# Patient Record
Sex: MALE | Race: WHITE | Employment: OTHER | URBAN - METROPOLITAN AREA
[De-identification: names, ages, dates, MRNs, and addresses within clinical notes are randomized per-mention and may not be internally consistent; named-entity substitution may affect disease eponyms.]

---

## 2018-01-03 ENCOUNTER — ESTABLISHED COMPREHENSIVE EXAM (OUTPATIENT)
Dept: URBAN - METROPOLITAN AREA CLINIC 46 | Facility: CLINIC | Age: 83
End: 2018-01-03

## 2018-01-03 DIAGNOSIS — H35.3130: ICD-10-CM

## 2018-01-03 DIAGNOSIS — H40.013: ICD-10-CM

## 2018-01-03 DIAGNOSIS — E11.9: ICD-10-CM

## 2018-01-03 DIAGNOSIS — Z79.4: ICD-10-CM

## 2018-01-03 PROCEDURE — G8427 DOCREV CUR MEDS BY ELIG CLIN: HCPCS

## 2018-01-03 PROCEDURE — 2022F DILAT RTA XM EVC RTNOPTHY: CPT

## 2018-01-03 PROCEDURE — 92014 COMPRE OPH EXAM EST PT 1/>: CPT

## 2018-01-03 PROCEDURE — 92133 CPTRZD OPH DX IMG PST SGM ON: CPT

## 2018-01-03 PROCEDURE — 92015 DETERMINE REFRACTIVE STATE: CPT

## 2018-01-03 PROCEDURE — 1036F TOBACCO NON-USER: CPT

## 2018-01-03 PROCEDURE — 3072F LOW RISK FOR RETINOPATHY: CPT

## 2018-01-03 ASSESSMENT — TONOMETRY
OD_IOP_MMHG: 13
OS_IOP_MMHG: 13

## 2018-01-03 ASSESSMENT — VISUAL ACUITY
OS_SC: 20/50-2
OS_CC: 20/40-2
OD_SC: CF 4FT
OD_CC: CF 6FT
OS_SC: J6
OS_CC: J2

## 2018-07-09 ENCOUNTER — ESTABLISHED PATIENT (OUTPATIENT)
Dept: URBAN - METROPOLITAN AREA CLINIC 36 | Facility: CLINIC | Age: 83
End: 2018-07-09

## 2018-07-09 DIAGNOSIS — H35.3131: ICD-10-CM

## 2018-07-09 DIAGNOSIS — H26.40: ICD-10-CM

## 2018-07-09 DIAGNOSIS — E11.3292: ICD-10-CM

## 2018-07-09 DIAGNOSIS — H34.8311: ICD-10-CM

## 2018-07-09 DIAGNOSIS — H35.363: ICD-10-CM

## 2018-07-09 DIAGNOSIS — H35.031: ICD-10-CM

## 2018-07-09 PROCEDURE — 2021F DILAT MACULAR EXAM DONE: CPT

## 2018-07-09 PROCEDURE — 2019F DILATED MACUL EXAM DONE: CPT

## 2018-07-09 PROCEDURE — 1036F TOBACCO NON-USER: CPT

## 2018-07-09 PROCEDURE — G8397 DIL MACULA/FUNDUS EXAM/W DOC: HCPCS

## 2018-07-09 PROCEDURE — 92134 CPTRZ OPH DX IMG PST SGM RTA: CPT

## 2018-07-09 PROCEDURE — 9222550 BILAT EXTENDED OPHTHALMOSCOPY, FIRST

## 2018-07-09 PROCEDURE — 92014 COMPRE OPH EXAM EST PT 1/>: CPT

## 2018-07-09 PROCEDURE — 2022F DILAT RTA XM EVC RTNOPTHY: CPT

## 2018-07-09 PROCEDURE — G8427 DOCREV CUR MEDS BY ELIG CLIN: HCPCS

## 2018-07-09 PROCEDURE — G8756 NO BP MEASURE DOC: HCPCS

## 2018-07-09 ASSESSMENT — VISUAL ACUITY
OS_CC: 20/40-1
OD_CC: CF 6FT

## 2018-07-09 ASSESSMENT — TONOMETRY
OD_IOP_MMHG: 13
OS_IOP_MMHG: 14

## 2019-02-20 ENCOUNTER — ESTABLISHED COMPREHENSIVE EXAM (OUTPATIENT)
Dept: URBAN - METROPOLITAN AREA CLINIC 46 | Facility: CLINIC | Age: 84
End: 2019-02-20

## 2019-02-20 DIAGNOSIS — H35.031: ICD-10-CM

## 2019-02-20 DIAGNOSIS — H34.8311: ICD-10-CM

## 2019-02-20 DIAGNOSIS — Z79.4: ICD-10-CM

## 2019-02-20 DIAGNOSIS — H35.3131: ICD-10-CM

## 2019-02-20 DIAGNOSIS — H04.123: ICD-10-CM

## 2019-02-20 DIAGNOSIS — E11.3292: ICD-10-CM

## 2019-02-20 DIAGNOSIS — H35.363: ICD-10-CM

## 2019-02-20 PROCEDURE — 68761Q PUNCTAL PLUG/QUINTESS DISSOLVABLE PLUG/EACH

## 2019-02-20 PROCEDURE — A4262 TEMPORARY TEAR DUCT PLUG: HCPCS

## 2019-02-20 PROCEDURE — 92015 DETERMINE REFRACTIVE STATE: CPT

## 2019-02-20 PROCEDURE — 92014 COMPRE OPH EXAM EST PT 1/>: CPT

## 2019-02-20 ASSESSMENT — TONOMETRY
OS_IOP_MMHG: 14
OD_IOP_MMHG: 14

## 2019-02-20 ASSESSMENT — VISUAL ACUITY
OS_CC: 20/40-2
OD_CC: CF 3FT
OS_CC: J2
OS_SC: J10
OD_SC: CF 3FT
OS_SC: 20/60+2

## 2019-07-15 ENCOUNTER — ESTABLISHED COMPREHENSIVE EXAM (OUTPATIENT)
Dept: URBAN - METROPOLITAN AREA CLINIC 36 | Facility: CLINIC | Age: 84
End: 2019-07-15

## 2019-07-15 DIAGNOSIS — H35.3131: ICD-10-CM

## 2019-07-15 DIAGNOSIS — H35.363: ICD-10-CM

## 2019-07-15 DIAGNOSIS — H34.8311: ICD-10-CM

## 2019-07-15 DIAGNOSIS — Z79.4: ICD-10-CM

## 2019-07-15 DIAGNOSIS — E11.3292: ICD-10-CM

## 2019-07-15 DIAGNOSIS — H35.031: ICD-10-CM

## 2019-07-15 PROCEDURE — 9222650 BILAT EXTENDED OPHTHALMOSCOPY, F/U

## 2019-07-15 PROCEDURE — 92134 CPTRZ OPH DX IMG PST SGM RTA: CPT

## 2019-07-15 PROCEDURE — 92014 COMPRE OPH EXAM EST PT 1/>: CPT

## 2019-07-15 ASSESSMENT — TONOMETRY
OS_IOP_MMHG: 13
OD_IOP_MMHG: 12

## 2019-07-15 ASSESSMENT — VISUAL ACUITY
OS_CC: 20/50+2
OD_CC: CF 2FT

## 2019-08-21 ENCOUNTER — FOLLOW UP (OUTPATIENT)
Dept: URBAN - METROPOLITAN AREA CLINIC 46 | Facility: CLINIC | Age: 84
End: 2019-08-21

## 2019-08-21 DIAGNOSIS — H01.004: ICD-10-CM

## 2019-08-21 DIAGNOSIS — E11.3292: ICD-10-CM

## 2019-08-21 DIAGNOSIS — Z79.4: ICD-10-CM

## 2019-08-21 DIAGNOSIS — H01.001: ICD-10-CM

## 2019-08-21 DIAGNOSIS — H04.123: ICD-10-CM

## 2019-08-21 DIAGNOSIS — H34.8311: ICD-10-CM

## 2019-08-21 DIAGNOSIS — H35.031: ICD-10-CM

## 2019-08-21 PROCEDURE — A4262 TEMPORARY TEAR DUCT PLUG: HCPCS

## 2019-08-21 PROCEDURE — 68761Q PUNCTAL PLUG/QUINTESS DISSOLVABLE PLUG/EACH

## 2019-08-21 PROCEDURE — 92012 INTRM OPH EXAM EST PATIENT: CPT

## 2019-08-21 RX ORDER — ERYTHROMYCIN 5 MG/G: OINTMENT OPHTHALMIC

## 2019-08-21 ASSESSMENT — TONOMETRY
OD_IOP_MMHG: 12
OS_IOP_MMHG: 13

## 2019-08-21 ASSESSMENT — VISUAL ACUITY: OD_CC: 20/25-2

## 2019-11-14 ENCOUNTER — ESTABLISHED PATIENT (OUTPATIENT)
Dept: URBAN - METROPOLITAN AREA CLINIC 46 | Facility: CLINIC | Age: 84
End: 2019-11-14

## 2019-11-14 DIAGNOSIS — H01.001: ICD-10-CM

## 2019-11-14 DIAGNOSIS — H01.004: ICD-10-CM

## 2019-11-14 DIAGNOSIS — H18.452: ICD-10-CM

## 2019-11-14 DIAGNOSIS — H10.45: ICD-10-CM

## 2019-11-14 PROCEDURE — 92012 INTRM OPH EXAM EST PATIENT: CPT

## 2019-11-14 RX ORDER — NEOMYCIN SULFATE, POLYMYXIN B SULFATE AND DEXAMETHASONE 3.5; 10000; 1 MG/ML; [USP'U]/ML; MG/ML: 1 SUSPENSION OPHTHALMIC

## 2019-11-14 ASSESSMENT — VISUAL ACUITY: OS_CC: 20/25-1

## 2019-11-14 ASSESSMENT — TONOMETRY
OD_IOP_MMHG: 13
OS_IOP_MMHG: 13

## 2020-02-24 ENCOUNTER — ESTABLISHED COMPREHENSIVE EXAM (OUTPATIENT)
Dept: URBAN - METROPOLITAN AREA CLINIC 46 | Facility: CLINIC | Age: 85
End: 2020-02-24

## 2020-02-24 DIAGNOSIS — H04.123: ICD-10-CM

## 2020-02-24 DIAGNOSIS — H01.001: ICD-10-CM

## 2020-02-24 DIAGNOSIS — H18.452: ICD-10-CM

## 2020-02-24 DIAGNOSIS — H35.3131: ICD-10-CM

## 2020-02-24 DIAGNOSIS — H01.004: ICD-10-CM

## 2020-02-24 DIAGNOSIS — H52.7: ICD-10-CM

## 2020-02-24 DIAGNOSIS — H10.45: ICD-10-CM

## 2020-02-24 PROCEDURE — 92014 COMPRE OPH EXAM EST PT 1/>: CPT

## 2020-02-24 PROCEDURE — A4262 TEMPORARY TEAR DUCT PLUG: HCPCS

## 2020-02-24 PROCEDURE — 92015 DETERMINE REFRACTIVE STATE: CPT

## 2020-02-24 PROCEDURE — 68761Q PUNCTAL PLUG/QUINTESS DISSOLVABLE PLUG/EACH

## 2020-02-24 ASSESSMENT — TONOMETRY
OD_IOP_MMHG: 15
OS_IOP_MMHG: 17

## 2020-02-24 ASSESSMENT — VISUAL ACUITY
OS_SC: 20/80-1
OD_CC: CF 2FT
OS_SC: J12
OD_SC: CF 2FT
OS_CC: 20/50-2
OS_PH: 20/40

## 2020-07-20 ENCOUNTER — ESTABLISHED COMPREHENSIVE EXAM (OUTPATIENT)
Dept: URBAN - METROPOLITAN AREA CLINIC 36 | Facility: CLINIC | Age: 85
End: 2020-07-20

## 2020-07-20 VITALS — HEIGHT: 60 IN | DIASTOLIC BLOOD PRESSURE: 55 MMHG | SYSTOLIC BLOOD PRESSURE: 93 MMHG | HEART RATE: 80 BPM

## 2020-07-20 DIAGNOSIS — H35.3132: ICD-10-CM

## 2020-07-20 DIAGNOSIS — H35.031: ICD-10-CM

## 2020-07-20 DIAGNOSIS — Z79.4: ICD-10-CM

## 2020-07-20 DIAGNOSIS — H34.8311: ICD-10-CM

## 2020-07-20 DIAGNOSIS — E11.3292: ICD-10-CM

## 2020-07-20 DIAGNOSIS — H35.363: ICD-10-CM

## 2020-07-20 PROCEDURE — 92014 COMPRE OPH EXAM EST PT 1/>: CPT

## 2020-07-20 PROCEDURE — 92134 CPTRZ OPH DX IMG PST SGM RTA: CPT

## 2020-07-20 PROCEDURE — 92202 OPSCPY EXTND ON/MAC DRAW: CPT

## 2020-07-20 ASSESSMENT — TONOMETRY
OD_IOP_MMHG: 15
OS_IOP_MMHG: 16

## 2020-07-20 ASSESSMENT — VISUAL ACUITY
OD_CC: CF 4FT
OS_CC: 20/30+2

## 2020-08-26 ENCOUNTER — FOLLOW UP (OUTPATIENT)
Dept: URBAN - METROPOLITAN AREA CLINIC 46 | Facility: CLINIC | Age: 85
End: 2020-08-26

## 2020-08-26 DIAGNOSIS — H35.363: ICD-10-CM

## 2020-08-26 DIAGNOSIS — Z79.4: ICD-10-CM

## 2020-08-26 DIAGNOSIS — H34.8311: ICD-10-CM

## 2020-08-26 DIAGNOSIS — H35.031: ICD-10-CM

## 2020-08-26 DIAGNOSIS — H40.012: ICD-10-CM

## 2020-08-26 DIAGNOSIS — H35.3132: ICD-10-CM

## 2020-08-26 DIAGNOSIS — E11.3292: ICD-10-CM

## 2020-08-26 PROCEDURE — 92134 CPTRZ OPH DX IMG PST SGM RTA: CPT

## 2020-08-26 PROCEDURE — 92012 INTRM OPH EXAM EST PATIENT: CPT

## 2020-08-26 ASSESSMENT — TONOMETRY
OS_IOP_MMHG: 16
OD_IOP_MMHG: 16

## 2020-08-26 ASSESSMENT — VISUAL ACUITY
OD_CC: CF 3FT
OS_CC: J1
OS_CC: 20/25-1

## 2021-03-03 ENCOUNTER — FOLLOW UP (OUTPATIENT)
Dept: URBAN - METROPOLITAN AREA CLINIC 46 | Facility: CLINIC | Age: 86
End: 2021-03-03

## 2021-03-03 DIAGNOSIS — H34.8311: ICD-10-CM

## 2021-03-03 DIAGNOSIS — H40.013: ICD-10-CM

## 2021-03-03 DIAGNOSIS — H35.031: ICD-10-CM

## 2021-03-03 DIAGNOSIS — H26.491: ICD-10-CM

## 2021-03-03 DIAGNOSIS — H40.89: ICD-10-CM

## 2021-03-03 PROCEDURE — 92083 EXTENDED VISUAL FIELD XM: CPT

## 2021-03-03 PROCEDURE — 92133 CPTRZD OPH DX IMG PST SGM ON: CPT

## 2021-03-03 PROCEDURE — 92012 INTRM OPH EXAM EST PATIENT: CPT

## 2021-03-03 ASSESSMENT — TONOMETRY
OS_IOP_MMHG: 15
OD_IOP_MMHG: 13

## 2021-03-03 ASSESSMENT — VISUAL ACUITY: OS_CC: 20/25-2

## 2021-06-03 ENCOUNTER — DILATED FUNDUS EXAM (OUTPATIENT)
Dept: URBAN - METROPOLITAN AREA CLINIC 46 | Facility: CLINIC | Age: 86
End: 2021-06-03

## 2021-06-03 DIAGNOSIS — H35.031: ICD-10-CM

## 2021-06-03 DIAGNOSIS — H40.012: ICD-10-CM

## 2021-06-03 DIAGNOSIS — D49.2: ICD-10-CM

## 2021-06-03 DIAGNOSIS — H34.8311: ICD-10-CM

## 2021-06-03 DIAGNOSIS — H40.89: ICD-10-CM

## 2021-06-03 DIAGNOSIS — H26.491: ICD-10-CM

## 2021-06-03 PROCEDURE — 92012 INTRM OPH EXAM EST PATIENT: CPT

## 2021-06-03 RX ORDER — BRIMONIDINE TARTRATE 2 MG/MG: 1 SOLUTION/ DROPS OPHTHALMIC TWICE A DAY

## 2021-06-03 ASSESSMENT — TONOMETRY
OD_IOP_MMHG: 09
OS_IOP_MMHG: 13

## 2021-06-03 ASSESSMENT — VISUAL ACUITY
OS_PH: 20/25-2
OS_CC: 20/40+2

## 2021-07-19 ENCOUNTER — DILATED FUNDUS EXAM (OUTPATIENT)
Dept: URBAN - METROPOLITAN AREA CLINIC 36 | Facility: CLINIC | Age: 86
End: 2021-07-19

## 2021-07-19 VITALS — HEIGHT: 60 IN | DIASTOLIC BLOOD PRESSURE: 63 MMHG | HEART RATE: 82 BPM | SYSTOLIC BLOOD PRESSURE: 112 MMHG

## 2021-07-19 DIAGNOSIS — H35.3114: ICD-10-CM

## 2021-07-19 DIAGNOSIS — H34.8311: ICD-10-CM

## 2021-07-19 DIAGNOSIS — E11.3293: ICD-10-CM

## 2021-07-19 DIAGNOSIS — H35.031: ICD-10-CM

## 2021-07-19 DIAGNOSIS — Z79.4: ICD-10-CM

## 2021-07-19 DIAGNOSIS — H35.363: ICD-10-CM

## 2021-07-19 DIAGNOSIS — H35.3122: ICD-10-CM

## 2021-07-19 PROCEDURE — 92202 OPSCPY EXTND ON/MAC DRAW: CPT

## 2021-07-19 PROCEDURE — 92014 COMPRE OPH EXAM EST PT 1/>: CPT

## 2021-07-19 PROCEDURE — 92134 CPTRZ OPH DX IMG PST SGM RTA: CPT

## 2021-07-19 RX ORDER — BRIMONIDINE TARTRATE 2 MG/MG: 1 SOLUTION/ DROPS OPHTHALMIC TWICE A DAY

## 2021-07-19 ASSESSMENT — VISUAL ACUITY
OS_CC: 20/40+1
OD_CC: CF 6FT

## 2021-07-19 ASSESSMENT — TONOMETRY
OS_IOP_MMHG: 12
OD_IOP_MMHG: 11

## 2021-10-04 ENCOUNTER — EST. PATIENT EMERGENCY (OUTPATIENT)
Dept: URBAN - METROPOLITAN AREA CLINIC 46 | Facility: CLINIC | Age: 86
End: 2021-10-04

## 2021-10-04 DIAGNOSIS — H26.40: ICD-10-CM

## 2021-10-04 DIAGNOSIS — E11.3293: ICD-10-CM

## 2021-10-04 DIAGNOSIS — H10.023: ICD-10-CM

## 2021-10-04 DIAGNOSIS — H18.452: ICD-10-CM

## 2021-10-04 DIAGNOSIS — H04.123: ICD-10-CM

## 2021-10-04 DIAGNOSIS — Z79.4: ICD-10-CM

## 2021-10-04 DIAGNOSIS — H34.8311: ICD-10-CM

## 2021-10-04 DIAGNOSIS — H40.013: ICD-10-CM

## 2021-10-04 PROCEDURE — 99214 OFFICE O/P EST MOD 30 MIN: CPT

## 2021-10-04 RX ORDER — MOXIFLOXACIN OPHTHALMIC 5 MG/ML: 1 SOLUTION/ DROPS OPHTHALMIC TWICE A DAY

## 2021-10-04 ASSESSMENT — VISUAL ACUITY
OS_PH: 20/40+2
OS_CC: 20/40-1
OD_CC: CF 6FT

## 2021-10-04 ASSESSMENT — TONOMETRY
OS_IOP_MMHG: 12
OD_IOP_MMHG: 08

## 2022-01-01 ENCOUNTER — APPOINTMENT (RX ONLY)
Dept: URBAN - METROPOLITAN AREA CLINIC 137 | Facility: CLINIC | Age: 87
Setting detail: DERMATOLOGY
End: 2022-01-01

## 2022-01-01 DIAGNOSIS — I87.2 VENOUS INSUFFICIENCY (CHRONIC) (PERIPHERAL): ICD-10-CM

## 2022-01-01 DIAGNOSIS — R60.0 LOCALIZED EDEMA: ICD-10-CM

## 2022-01-01 PROCEDURE — ? PRESCRIPTION MEDICATION MANAGEMENT

## 2022-01-01 PROCEDURE — ? COUNSELING

## 2022-01-01 PROCEDURE — 99213 OFFICE O/P EST LOW 20 MIN: CPT

## 2022-01-01 ASSESSMENT — LOCATION DETAILED DESCRIPTION DERM
LOCATION DETAILED: RIGHT DISTAL PRETIBIAL REGION
LOCATION DETAILED: LEFT DISTAL PRETIBIAL REGION

## 2022-01-01 ASSESSMENT — LOCATION SIMPLE DESCRIPTION DERM
LOCATION SIMPLE: LEFT PRETIBIAL REGION
LOCATION SIMPLE: RIGHT PRETIBIAL REGION

## 2022-01-01 ASSESSMENT — LOCATION ZONE DERM: LOCATION ZONE: LEG

## 2022-02-04 ENCOUNTER — TECH ONLY (OUTPATIENT)
Dept: URBAN - METROPOLITAN AREA CLINIC 46 | Facility: CLINIC | Age: 87
End: 2022-02-04

## 2022-02-04 DIAGNOSIS — H26.40: ICD-10-CM

## 2022-02-04 DIAGNOSIS — H35.031: ICD-10-CM

## 2022-02-04 DIAGNOSIS — H35.3122: ICD-10-CM

## 2022-02-04 DIAGNOSIS — H04.123: ICD-10-CM

## 2022-02-04 DIAGNOSIS — Z79.4: ICD-10-CM

## 2022-02-04 DIAGNOSIS — E11.3293: ICD-10-CM

## 2022-02-04 DIAGNOSIS — H40.013: ICD-10-CM

## 2022-02-04 DIAGNOSIS — H10.023: ICD-10-CM

## 2022-02-04 DIAGNOSIS — H18.452: ICD-10-CM

## 2022-02-04 DIAGNOSIS — H34.8311: ICD-10-CM

## 2022-02-04 PROCEDURE — 92133 CPTRZD OPH DX IMG PST SGM ON: CPT

## 2022-02-04 PROCEDURE — 99211T TECH SERVICE

## 2022-02-04 PROCEDURE — 92083 EXTENDED VISUAL FIELD XM: CPT

## 2022-02-11 ENCOUNTER — FOLLOW UP (OUTPATIENT)
Dept: URBAN - METROPOLITAN AREA CLINIC 46 | Facility: CLINIC | Age: 87
End: 2022-02-11

## 2022-02-11 DIAGNOSIS — H40.013: ICD-10-CM

## 2022-02-11 DIAGNOSIS — H34.8311: ICD-10-CM

## 2022-02-11 DIAGNOSIS — E11.3293: ICD-10-CM

## 2022-02-11 DIAGNOSIS — H35.031: ICD-10-CM

## 2022-02-11 DIAGNOSIS — H40.023: ICD-10-CM

## 2022-02-11 DIAGNOSIS — H26.40: ICD-10-CM

## 2022-02-11 DIAGNOSIS — H18.452: ICD-10-CM

## 2022-02-11 DIAGNOSIS — Z79.4: ICD-10-CM

## 2022-02-11 PROCEDURE — 92012 INTRM OPH EXAM EST PATIENT: CPT

## 2022-02-11 ASSESSMENT — VISUAL ACUITY
OD_CC: CF 3FT
OS_CC: J1
OS_CC: 20/40+2

## 2022-02-11 ASSESSMENT — TONOMETRY
OD_IOP_MMHG: 14
OS_IOP_MMHG: 19
OS_IOP_MMHG: 18
OD_IOP_MMHG: 12

## 2022-02-24 ENCOUNTER — ESTABLISHED PATIENT (OUTPATIENT)
Dept: URBAN - METROPOLITAN AREA CLINIC 36 | Facility: CLINIC | Age: 87
End: 2022-02-24

## 2022-02-24 DIAGNOSIS — D23.30: ICD-10-CM

## 2022-02-24 DIAGNOSIS — D23.10: ICD-10-CM

## 2022-02-24 DIAGNOSIS — D23.111: ICD-10-CM

## 2022-02-24 DIAGNOSIS — H04.123: ICD-10-CM

## 2022-02-24 PROCEDURE — 99213 OFFICE O/P EST LOW 20 MIN: CPT

## 2022-02-24 PROCEDURE — 92285 EXTERNAL OCULAR PHOTOGRAPHY: CPT

## 2022-02-24 PROCEDURE — 67840 REMOVE EYELID LESION: CPT

## 2022-02-24 ASSESSMENT — VISUAL ACUITY
OS_CC: 20/40-2
OD_CC: 20/40-2

## 2022-12-15 ENCOUNTER — COMPREHENSIVE EXAM (OUTPATIENT)
Dept: URBAN - METROPOLITAN AREA CLINIC 46 | Facility: CLINIC | Age: 87
End: 2022-12-15

## 2022-12-15 PROCEDURE — 92015 DETERMINE REFRACTIVE STATE: CPT

## 2022-12-15 PROCEDURE — 92014 COMPRE OPH EXAM EST PT 1/>: CPT

## 2022-12-15 RX ORDER — BRIMONIDINE TARTRATE 1 MG/ML: 1 SOLUTION/ DROPS OPHTHALMIC

## 2022-12-15 ASSESSMENT — TONOMETRY
OS_IOP_MMHG: 13
OD_IOP_MMHG: 11

## 2022-12-15 ASSESSMENT — VISUAL ACUITY
OS_CC: J10
OS_CC: 20/50
OD_CC: CF 6FT

## 2022-12-16 PROBLEM — I87.2 VENOUS INSUFFICIENCY (CHRONIC) (PERIPHERAL): Status: ACTIVE | Noted: 2022-01-01

## 2022-12-16 PROBLEM — R60.0 LOCALIZED EDEMA: Status: ACTIVE | Noted: 2022-01-01

## 2022-12-16 NOTE — PROCEDURE: COUNSELING
Detail Level: Simple
Patient Specific Counseling (Will Not Stick From Patient To Patient): Recommend wearing daily knee-high compression stockings while awake. \\n- Elevate legs when sitting/watching TV and in evenings. \\n- Continue triamcinolone BID prn x 2 weeks Use vaseline or other emollient once acute irritation has resolved. Repeat cycle as needed. Side effects of chronic topical steroid use reviewed, including thinning of the skin, blood vessel growth, and striae. Avoid potent steroid use on face, axillae, groin, thin skin. \\n- Sensitive/dry skin care reviewed, including use of daily to BID unscented moisturizers and soaps, such as vaseline, Eucerin, CeraVe, and Cetaphil.  \\n- Continue f/u as appropriate with PCP for relevant chronic illness management